# Patient Record
Sex: FEMALE | URBAN - METROPOLITAN AREA
[De-identification: names, ages, dates, MRNs, and addresses within clinical notes are randomized per-mention and may not be internally consistent; named-entity substitution may affect disease eponyms.]

---

## 2022-05-06 ENCOUNTER — OFFICE VISIT (OUTPATIENT)
Dept: FAMILY MEDICINE CLINIC | Facility: CLINIC | Age: 42
End: 2022-05-06
Payer: COMMERCIAL

## 2022-05-06 VITALS — WEIGHT: 230 LBS | BODY MASS INDEX: 40.75 KG/M2 | HEIGHT: 63 IN

## 2022-05-06 DIAGNOSIS — Z12.4 SCREENING FOR MALIGNANT NEOPLASM OF CERVIX: ICD-10-CM

## 2022-05-06 DIAGNOSIS — F41.9 ANXIETY AND DEPRESSION: ICD-10-CM

## 2022-05-06 DIAGNOSIS — N91.2 AMENORRHEA, UNSPECIFIED: ICD-10-CM

## 2022-05-06 DIAGNOSIS — Z11.3 SCREENING FOR STD (SEXUALLY TRANSMITTED DISEASE): ICD-10-CM

## 2022-05-06 DIAGNOSIS — F32.A ANXIETY AND DEPRESSION: ICD-10-CM

## 2022-05-06 DIAGNOSIS — E66.01 CLASS 3 SEVERE OBESITY DUE TO EXCESS CALORIES WITHOUT SERIOUS COMORBIDITY WITH BODY MASS INDEX (BMI) OF 40.0 TO 44.9 IN ADULT (HCC): ICD-10-CM

## 2022-05-06 DIAGNOSIS — Z87.42 HISTORY OF OVARIAN CYST: ICD-10-CM

## 2022-05-06 DIAGNOSIS — Z13.29 SCREENING FOR THYROID DISORDER: ICD-10-CM

## 2022-05-06 DIAGNOSIS — Z13.89 SCREENING FOR GENITOURINARY CONDITION: ICD-10-CM

## 2022-05-06 DIAGNOSIS — Z00.00 WELLNESS EXAMINATION: Primary | ICD-10-CM

## 2022-05-06 DIAGNOSIS — Z12.31 ENCOUNTER FOR SCREENING MAMMOGRAM FOR MALIGNANT NEOPLASM OF BREAST: ICD-10-CM

## 2022-05-06 DIAGNOSIS — J45.909 ASTHMA DUE TO ENVIRONMENTAL ALLERGIES: ICD-10-CM

## 2022-05-06 DIAGNOSIS — Z13.0 SCREENING FOR DEFICIENCY ANEMIA: ICD-10-CM

## 2022-05-06 DIAGNOSIS — M72.2 PLANTAR FASCIITIS, BILATERAL: ICD-10-CM

## 2022-05-06 DIAGNOSIS — Z13.1 SCREENING FOR DIABETES MELLITUS: ICD-10-CM

## 2022-05-06 DIAGNOSIS — Z13.6 ENCOUNTER FOR LIPID SCREENING FOR CARDIOVASCULAR DISEASE: ICD-10-CM

## 2022-05-06 DIAGNOSIS — E01.0 THYROMEGALY: ICD-10-CM

## 2022-05-06 DIAGNOSIS — Z13.220 ENCOUNTER FOR LIPID SCREENING FOR CARDIOVASCULAR DISEASE: ICD-10-CM

## 2022-05-06 PROCEDURE — 3008F BODY MASS INDEX DOCD: CPT | Performed by: STUDENT IN AN ORGANIZED HEALTH CARE EDUCATION/TRAINING PROGRAM

## 2022-05-06 PROCEDURE — 96127 BRIEF EMOTIONAL/BEHAV ASSMT: CPT | Performed by: STUDENT IN AN ORGANIZED HEALTH CARE EDUCATION/TRAINING PROGRAM

## 2022-05-06 RX ORDER — GARLIC EXTRACT 500 MG
1 CAPSULE ORAL DAILY
COMMUNITY

## 2022-05-06 RX ORDER — ALBUTEROL SULFATE 90 UG/1
1 AEROSOL, METERED RESPIRATORY (INHALATION) EVERY 4 HOURS PRN
COMMUNITY
Start: 2022-01-22

## 2022-05-06 RX ORDER — MULTIVIT-MIN/IRON FUM/FOLIC AC 7.5 MG-4
1 TABLET ORAL DAILY
COMMUNITY

## 2022-05-09 LAB
C TRACH DNA SPEC QL NAA+PROBE: NEGATIVE
N GONORRHOEA DNA SPEC QL NAA+PROBE: NEGATIVE

## 2022-05-11 LAB — HPV I/H RISK 1 DNA SPEC QL NAA+PROBE: NEGATIVE

## 2022-07-18 NOTE — PROGRESS NOTES
Pt. To [facility] c/o right heel pain and R calf pain. Reports has had heel pain on and off for 2 years. Reports over the past 2 days pain has significantly increased and moves to R calf. Denies any recent injury/trauma. Reports 2 years ago did have a fall, states does not think XR R foot, did XR Left foot and was referred to PT and did not have any improvement. Pt. Reports pain has been severe and nothing is helping, walking makes heel and calf pain worse. Pt. With no obvious deformity, ecchymosis, edema on exam. Pt. With tenderness to left heel and posterior calf with palpation. Discussed limitations of [employer], referred to IC for further eval. No charge.

## 2023-09-21 NOTE — PROGRESS NOTES
Pt presents to [facility] for right second finger pain  Pt jammed finger on shopping cart at work at [employer] 2d ago  Pain with movement of finger is severe, causing pt to sweat d/t intensity of pain  Pt is using 400mg ibu for pain  Pt advised to proceed to IC for possible xray of finger  Pt agrees with plan  Called [facility], spoke to provider, Campbell Nelson, who agrees with transfer  Pt will proceed to [facility] now to further eval and treat

## 2023-09-21 NOTE — DISCHARGE INSTRUCTIONS
Wear the finger splint as needed for comfort. Ice and elevate the affected area. Tylenol or ibuprofen as needed for pain. Activity as tolerated. Follow-up with primary care. See your discharge paperwork for referral information. If there are any new, changing or worsening symptoms return for reevaluation or go to the ER.
